# Patient Record
Sex: FEMALE | Race: WHITE | NOT HISPANIC OR LATINO | Employment: FULL TIME | RURAL
[De-identification: names, ages, dates, MRNs, and addresses within clinical notes are randomized per-mention and may not be internally consistent; named-entity substitution may affect disease eponyms.]

---

## 2023-08-16 ENCOUNTER — OUTSIDE FACILITY SERVICE (OUTPATIENT)
Dept: CARDIOLOGY | Facility: CLINIC | Age: 58
End: 2023-08-16
Payer: COMMERCIAL

## 2023-08-17 ENCOUNTER — TELEPHONE (OUTPATIENT)
Dept: CARDIOLOGY | Facility: CLINIC | Age: 58
End: 2023-08-17

## 2023-08-17 NOTE — TELEPHONE ENCOUNTER
[facility] received a fax that requires your attention. The document has been indexed to the patient’s chart for your review.      Reason for sending: EXTERNAL MEDICAL RECORD NOTIFICATION     Documents Description: [facility] PT FACE SHEET-8.17.23    Name of Sender: [facility] ADMISSIONS     Date Indexed: 8.17.23

## 2024-06-25 ENCOUNTER — TRANSCRIBE ORDERS (OUTPATIENT)
Dept: ADMINISTRATIVE | Facility: HOSPITAL | Age: 59
End: 2024-06-25
Payer: COMMERCIAL

## 2024-06-25 DIAGNOSIS — C54.9 MALIGNANT MIXED MULLERIAN TUMOR (MMMT): Primary | ICD-10-CM
